# Patient Record
Sex: MALE
[De-identification: names, ages, dates, MRNs, and addresses within clinical notes are randomized per-mention and may not be internally consistent; named-entity substitution may affect disease eponyms.]

---

## 2021-02-08 PROBLEM — Z00.00 ENCOUNTER FOR PREVENTIVE HEALTH EXAMINATION: Status: ACTIVE | Noted: 2021-02-08

## 2021-02-09 ENCOUNTER — APPOINTMENT (OUTPATIENT)
Dept: ORTHOPEDIC SURGERY | Facility: CLINIC | Age: 41
End: 2021-02-09
Payer: COMMERCIAL

## 2021-02-09 VITALS — BODY MASS INDEX: 25.77 KG/M2 | WEIGHT: 180 LBS | HEIGHT: 70 IN

## 2021-02-09 PROCEDURE — 99072 ADDL SUPL MATRL&STAF TM PHE: CPT

## 2021-02-09 PROCEDURE — 99203 OFFICE O/P NEW LOW 30 MIN: CPT | Mod: 25

## 2021-02-09 PROCEDURE — 20605 DRAIN/INJ JOINT/BURSA W/O US: CPT | Mod: RT

## 2021-02-09 NOTE — HISTORY OF PRESENT ILLNESS
[de-identified] : TRAVIS CHACKO  is a 41 year old LHD  male  presenting to the office complaining of right elbow pain. Patient reports pain began on 01/25/2021 Patient reports he was playing with his children the day before but patient denies injury or trauma to the area. The patient describes the pain as a dull aching, and occasionally sharp pain localized to the posterior  aspect of his right elbow that is intermittent in nature. His  symptoms are exacerbated with leaning on his elbow. Patient reports the pain is not waking him  up at night.  Patient denies associated weakness. Denies numbness and tingling in the upper extremity. Patient is taking NSAIDs and icing for pain relief with moderate relief in symptoms.  Patient denies any other complaints at this time.

## 2021-02-09 NOTE — DISCUSSION/SUMMARY
[de-identified] : The underlying pathophysiology was reviewed in great detail with the patient as well as the various treatment options, including ice, analgesics, NSAIDs, Physical therapy, steroid injections, aspiration \par \par Patient received an aspiration and corticosteroid injection of the right olecranon bursa today. \par \par Activity modifications and restrictions were discussed. I recommend that he  avoid heavy gripping/squeezing. Discussed use of elbow pads for protection. \par \par FU 6 weeks or prn. \par \par All questions were answered, all alternatives discussed and the patient is in complete agreement with that plan. Follow-up appointment as instructed. Any issues and the patient will call or come in sooner.

## 2021-02-09 NOTE — PROCEDURE
[de-identified] : At this point I recommended aspiration and therapeutic injection and under sterile precautions an injection of 0.25 cc 1% lidocaine with 0.25 cc of Kenalog and 0.25 cc of Dexamethasone- was placed into the right olecranon bursa without complication after 7 cc of clear synovial fluid was aspirated and after several minutes the patient felt significant relief.\par \par

## 2021-02-09 NOTE — PHYSICAL EXAM
[de-identified] : Right Upper Extremity\par o Elbow :\par ¦ Inspection/Palpation : no tenderness, no swelling, no deformities\par ¦ Range of Motion : full and painless in all planes, no crepitus\par ¦ Strength : flexion and extension 5/5\par ¦ Stability : no joint instability on provocative testing\par o Muscle Bulk : no atrophy\par o Sensation : sensation intact to light touch\par o Skin : no skin lesions, no discoloration\par o Vascular Exam : no edema, no cyanosis, radial and ulnar pulses normal \par \par Left Upper Extremity\par o Elbow :\par ¦ Inspection/Palpation : mild tenderness to palpation olecranon bursa with localized swelling, no deformities, no erythema, or warmth. \par ¦ Range of Motion : full and painless in all planes, no crepitus\par ¦ Strength : flexion and extension 5/5\par ¦ Stability : no joint instability on provocative testing\par o Muscle Bulk : no atrophy\par o Sensation : sensation intact to light touch\par o Skin : no skin lesions, no discoloration\par o Vascular Exam : no edema, no cyanosis, radial and ulnar pulses normal \par \par \par \par

## 2021-03-05 ENCOUNTER — APPOINTMENT (OUTPATIENT)
Dept: ORTHOPEDIC SURGERY | Facility: CLINIC | Age: 41
End: 2021-03-05
Payer: COMMERCIAL

## 2021-03-05 VITALS — HEIGHT: 69 IN | WEIGHT: 180 LBS | BODY MASS INDEX: 26.66 KG/M2

## 2021-03-05 DIAGNOSIS — M70.21 OLECRANON BURSITIS, RIGHT ELBOW: ICD-10-CM

## 2021-03-05 DIAGNOSIS — M71.121 OTHER INFECTIVE BURSITIS, RIGHT ELBOW: ICD-10-CM

## 2021-03-05 PROCEDURE — 99072 ADDL SUPL MATRL&STAF TM PHE: CPT

## 2021-03-05 PROCEDURE — 99213 OFFICE O/P EST LOW 20 MIN: CPT

## 2021-03-05 NOTE — HISTORY OF PRESENT ILLNESS
[de-identified] : TRAVIS CHACKO  is a 41 year old LHD  male  presenting to the office complaining of right elbow pain. Patient reports pain began on 01/25/2021 Patient reports he was playing with his children the day before but patient denies injury or trauma to the area. At his last visit on 02/2/2021 patient received an aspiration and corticosteroid injection of the olecranon bursa. Patient note resolution of symptoms initial but notes the swelling returned 1 week ago. He also notes the area is now red and warm to the touch. The patient describes the pain as a dull aching, and occasionally sharp pain localized to the posterior  aspect of his right elbow that is intermittent in nature. His  symptoms are exacerbated with leaning on his elbow. Patient reports the pain is not waking him  up at night.  Patient denies associated weakness. Denies numbness and tingling in the upper extremity. Patient is taking NSAIDs and icing for pain relief with moderate relief in symptoms.  Patient denies fever/chills/nausea/ vomiting or discharge.  Patient denies any other complaints at this time.

## 2021-03-05 NOTE — DISCUSSION/SUMMARY
[de-identified] : The underlying pathophysiology was reviewed in great detail with the patient as well as the various treatment options, including ice, analgesics, NSAIDs, Physical therapy, steroid injections, aspiration \par \par A prescription was provided for Augmentin. Patient was advised to take the medication two times a day for 7 days. \par \par Activity modifications and restrictions were discussed. I recommend that he  avoid heavy gripping/squeezing. Discussed use of elbow pads for protection. \par \par FU 1 week. Patient was advised if swelling increases, patient develops fever, chills N/V to call the office or go to the ED. \par \par All questions were answered, all alternatives discussed and the patient is in complete agreement with that plan. Follow-up appointment as instructed. Any issues and the patient will call or come in sooner.

## 2021-03-05 NOTE — PHYSICAL EXAM
[de-identified] : \par \par Right Upper Extremity\par o Elbow :\par ¦ Inspection/Palpation : mild tenderness to palpation olecranon bursa with localized swelling, mild erythema localized to the olecranon bursa, with palpable warmth. \par ¦ Range of Motion : full and painless in all planes, no crepitus\par ¦ Strength : flexion and extension 5/5\par ¦ Stability : no joint instability on provocative testing\par o Muscle Bulk : no atrophy\par o Sensation : sensation intact to light touch\par o Skin : no skin lesions, no discoloration\par o Vascular Exam : no edema, no cyanosis, radial and ulnar pulses normal \par \par \par \par

## 2021-03-11 RX ORDER — AMOXICILLIN AND CLAVULANATE POTASSIUM 500; 125 MG/1; MG/1
500-125 TABLET, FILM COATED ORAL
Qty: 14 | Refills: 0 | Status: ACTIVE | COMMUNITY
Start: 2021-03-05 | End: 1900-01-01